# Patient Record
Sex: MALE | Race: WHITE
[De-identification: names, ages, dates, MRNs, and addresses within clinical notes are randomized per-mention and may not be internally consistent; named-entity substitution may affect disease eponyms.]

---

## 2018-07-18 NOTE — HISTORY AND PHYSICAL
DATE OF SERVICE:  



ADMISSION HISTORY AND PHYSICAL



This will be for date of service and date of admission of 07/25/2018 for left

total knee arthroplasty.



HISTORY OF PRESENT ILLNESS:

The patient is a 68-year-old gentleman with progressively worsening left knee

pain, swelling and activity limitations.  Radiographs reveal severe medial and

patellofemoral arthrosis with complete loss of joint space.  He has undergone

treatment in the past with arthroscopy, injections and anti-inflammatories

without relief.  He reports he is unable to participate in his activities of

daily living because of the knee.  Due to functional impairment and failure to

improve with conservative measures, the patient elected to proceed with surgical

intervention.



REVIEW OF SYSTEMS:

No chest pain, no shortness of breath and no dysuria.



PAST MEDICAL HISTORY:

Hypertension.



PAST SURGICAL HISTORY:

Heart catheterization and arthroscopy.



FAMILY HISTORY:

Ischemic heart disease and hypertension.



PRIMARY CARE PROVIDER:

 _____



MEDICATIONS:

Alprazolam, TriCor, meloxicam, hydrochlorothiazide, Norvasc, Zocor, Benicar,

aspirin and fish oil.



ALLERGIES:

No known drug allergies.



SOCIAL HISTORY:

The patient smokes half pack of cigarettes a day.  Drinks alcohol daily.



PHYSICAL EXAMINATION:

GENERAL:  The patient is well-developed, well-nourished, no acute distress.

HEENT:  Normocephalic and atraumatic.  Pupils are equal, round and reactive to

light.  Oropharynx is clear.

NECK:  Supple and no lymphadenopathy.

LUNGS:  Clear to auscultation bilaterally.

HEART:  Regular rate and rhythm.

ABDOMEN:  Soft, nontender and nondistended.

EXTREMITIES:  The left knee demonstrates a slight effusion.  There is no

erythema or warmth.  Range of motion is 0/3/125 with a negative Lachman,

negative anterior and posterior drawer.  No varus valgus laxity and negative

pivot shift.  The patient ambulates with an antalgic gait.



IMPRESSION:

Severe left knee osteoarthritis.



PLAN:

Left total knee arthroplasty.  The risks, benefits, options, ramifications and

recovery have been discussed with the patient.  He understands and wishes to

proceed.  He will require inpatient admission for pain management, gait

abnormalities and weakness.





Job ID: [id number]

DocumentID: [id number]

Dictated Date:  07/18/2018 12:04:06

Transcription Date: 07/18/2018 12:27:08

Dictated By: MOLINA MEDINA MD

## 2018-07-19 ENCOUNTER — HOSPITAL ENCOUNTER (OUTPATIENT)
Dept: HOSPITAL 75 - PREOP | Age: 68
Discharge: HOME | End: 2018-07-19
Attending: ORTHOPAEDIC SURGERY
Payer: COMMERCIAL

## 2018-07-19 VITALS — BODY MASS INDEX: 27.64 KG/M2 | WEIGHT: 172 LBS | HEIGHT: 66 IN

## 2018-07-19 VITALS — DIASTOLIC BLOOD PRESSURE: 83 MMHG | SYSTOLIC BLOOD PRESSURE: 155 MMHG

## 2018-07-19 DIAGNOSIS — Z11.2: ICD-10-CM

## 2018-07-19 DIAGNOSIS — R53.83: ICD-10-CM

## 2018-07-19 DIAGNOSIS — Z01.812: ICD-10-CM

## 2018-07-19 DIAGNOSIS — M17.12: ICD-10-CM

## 2018-07-19 DIAGNOSIS — Z01.811: Primary | ICD-10-CM

## 2018-07-19 LAB
ALBUMIN SERPL-MCNC: 4.6 GM/DL (ref 3.2–4.5)
ALP SERPL-CCNC: 40 U/L (ref 40–136)
ALT SERPL-CCNC: 29 U/L (ref 0–55)
APTT PPP: YELLOW S
BACTERIA #/AREA URNS HPF: (no result) /HPF
BASOPHILS # BLD AUTO: 0.1 10^3/UL (ref 0–0.1)
BASOPHILS NFR BLD AUTO: 1 % (ref 0–10)
BILIRUB SERPL-MCNC: 0.4 MG/DL (ref 0.1–1)
BILIRUB UR QL STRIP: NEGATIVE
BUN/CREAT SERPL: 27
CALCIUM SERPL-MCNC: 11.1 MG/DL (ref 8.5–10.1)
CHLORIDE SERPL-SCNC: 105 MMOL/L (ref 98–107)
CO2 SERPL-SCNC: 27 MMOL/L (ref 21–32)
CREAT SERPL-MCNC: 1.07 MG/DL (ref 0.6–1.3)
EOSINOPHIL # BLD AUTO: 0.2 10^3/UL (ref 0–0.3)
EOSINOPHIL NFR BLD AUTO: 2 % (ref 0–10)
ERYTHROCYTE [DISTWIDTH] IN BLOOD BY AUTOMATED COUNT: 14.1 % (ref 10–14.5)
ERYTHROCYTE [SEDIMENTATION RATE] IN BLOOD: 1 MM/HR (ref 0–30)
FIBRINOGEN PPP-MCNC: CLEAR MG/DL
GFR SERPLBLD BASED ON 1.73 SQ M-ARVRAT: > 60 ML/MIN
GLUCOSE SERPL-MCNC: 104 MG/DL (ref 70–105)
GLUCOSE UR STRIP-MCNC: NEGATIVE MG/DL
HCT VFR BLD CALC: 41 % (ref 40–54)
HGB BLD-MCNC: 14 G/DL (ref 13.3–17.7)
INR PPP: 1 (ref 0.8–1.4)
KETONES UR QL STRIP: NEGATIVE
LEUKOCYTE ESTERASE UR QL STRIP: NEGATIVE
LYMPHOCYTES # BLD AUTO: 3.9 X 10^3 (ref 1–4)
LYMPHOCYTES NFR BLD AUTO: 35 % (ref 12–44)
MANUAL DIFFERENTIAL PERFORMED BLD QL: NO
MCH RBC QN AUTO: 30 PG (ref 25–34)
MCHC RBC AUTO-ENTMCNC: 34 G/DL (ref 32–36)
MCV RBC AUTO: 87 FL (ref 80–99)
MONOCYTES # BLD AUTO: 1 X 10^3 (ref 0–1)
MONOCYTES NFR BLD AUTO: 9 % (ref 0–12)
NEUTROPHILS # BLD AUTO: 5.9 X 10^3 (ref 1.8–7.8)
NEUTROPHILS NFR BLD AUTO: 53 % (ref 42–75)
NITRITE UR QL STRIP: NEGATIVE
PH UR STRIP: 6.5 [PH] (ref 5–9)
PLATELET # BLD: 393 10^3/UL (ref 130–400)
PMV BLD AUTO: 10.7 FL (ref 7.4–10.4)
POTASSIUM SERPL-SCNC: 3.5 MMOL/L (ref 3.6–5)
PROT SERPL-MCNC: 7.6 GM/DL (ref 6.4–8.2)
PROT UR QL STRIP: NEGATIVE
PROTHROMBIN TIME: 13 SEC (ref 12.2–14.7)
RBC # BLD AUTO: 4.7 10^6/UL (ref 4.35–5.85)
RBC #/AREA URNS HPF: (no result) /HPF
SODIUM SERPL-SCNC: 139 MMOL/L (ref 135–145)
SP GR UR STRIP: 1.01 (ref 1.02–1.02)
SQUAMOUS #/AREA URNS HPF: (no result) /HPF
UROBILINOGEN UR-MCNC: NORMAL MG/DL
WBC # BLD AUTO: 11.1 10^3/UL (ref 4.3–11)
WBC #/AREA URNS HPF: (no result) /HPF

## 2018-07-19 PROCEDURE — 85652 RBC SED RATE AUTOMATED: CPT

## 2018-07-19 PROCEDURE — 86850 RBC ANTIBODY SCREEN: CPT

## 2018-07-19 PROCEDURE — 85610 PROTHROMBIN TIME: CPT

## 2018-07-19 PROCEDURE — 86900 BLOOD TYPING SEROLOGIC ABO: CPT

## 2018-07-19 PROCEDURE — 80053 COMPREHEN METABOLIC PANEL: CPT

## 2018-07-19 PROCEDURE — 71046 X-RAY EXAM CHEST 2 VIEWS: CPT

## 2018-07-19 PROCEDURE — 86901 BLOOD TYPING SEROLOGIC RH(D): CPT

## 2018-07-19 PROCEDURE — 87081 CULTURE SCREEN ONLY: CPT

## 2018-07-19 PROCEDURE — 81000 URINALYSIS NONAUTO W/SCOPE: CPT

## 2018-07-19 PROCEDURE — 85025 COMPLETE CBC W/AUTO DIFF WBC: CPT

## 2018-07-19 PROCEDURE — 36415 COLL VENOUS BLD VENIPUNCTURE: CPT

## 2018-07-19 NOTE — DIAGNOSTIC IMAGING REPORT
INDICATION: Preop for total knee arthroplasty.



TIME OF EXAMINATION: 02:30 p.m.



COMPARISON: Comparison is made with prior study from 02/22/2011.



FINDINGS: The heart size is normal. The pulmonary vascularity is

unremarkable. The lungs are clear. No infiltrate, effusion or

pneumothorax is detected.



IMPRESSION: No acute cardiopulmonary process is detected.



Dictated by: 



  Dictated on workstation # [id number]

## 2018-07-25 ENCOUNTER — HOSPITAL ENCOUNTER (INPATIENT)
Dept: HOSPITAL 75 - 4TH | Age: 68
LOS: 2 days | Discharge: HOME HEALTH | DRG: 470 | End: 2018-07-27
Attending: ORTHOPAEDIC SURGERY | Admitting: ORTHOPAEDIC SURGERY
Payer: COMMERCIAL

## 2018-07-25 VITALS — SYSTOLIC BLOOD PRESSURE: 126 MMHG | DIASTOLIC BLOOD PRESSURE: 76 MMHG

## 2018-07-25 VITALS — WEIGHT: 172 LBS | HEIGHT: 66 IN | BODY MASS INDEX: 27.64 KG/M2

## 2018-07-25 VITALS — SYSTOLIC BLOOD PRESSURE: 119 MMHG | DIASTOLIC BLOOD PRESSURE: 61 MMHG

## 2018-07-25 VITALS — DIASTOLIC BLOOD PRESSURE: 66 MMHG | SYSTOLIC BLOOD PRESSURE: 130 MMHG

## 2018-07-25 DIAGNOSIS — M17.12: Primary | ICD-10-CM

## 2018-07-25 DIAGNOSIS — I10: ICD-10-CM

## 2018-07-25 DIAGNOSIS — K21.9: ICD-10-CM

## 2018-07-25 DIAGNOSIS — F41.9: ICD-10-CM

## 2018-07-25 DIAGNOSIS — Z72.89: ICD-10-CM

## 2018-07-25 DIAGNOSIS — F17.210: ICD-10-CM

## 2018-07-25 PROCEDURE — 0SRD0J9 REPLACEMENT OF LEFT KNEE JOINT WITH SYNTHETIC SUBSTITUTE, CEMENTED, OPEN APPROACH: ICD-10-PCS | Performed by: ORTHOPAEDIC SURGERY

## 2018-07-25 PROCEDURE — 85018 HEMOGLOBIN: CPT

## 2018-07-25 PROCEDURE — 86850 RBC ANTIBODY SCREEN: CPT

## 2018-07-25 PROCEDURE — 86900 BLOOD TYPING SEROLOGIC ABO: CPT

## 2018-07-25 PROCEDURE — 94664 DEMO&/EVAL PT USE INHALER: CPT

## 2018-07-25 PROCEDURE — 86901 BLOOD TYPING SEROLOGIC RH(D): CPT

## 2018-07-25 PROCEDURE — 73560 X-RAY EXAM OF KNEE 1 OR 2: CPT

## 2018-07-25 PROCEDURE — 85014 HEMATOCRIT: CPT

## 2018-07-25 PROCEDURE — 36415 COLL VENOUS BLD VENIPUNCTURE: CPT

## 2018-07-25 RX ADMIN — OXYCODONE HYDROCHLORIDE AND ACETAMINOPHEN PRN TAB: 5; 325 TABLET ORAL at 18:21

## 2018-07-25 RX ADMIN — SODIUM CHLORIDE SCH MLS/HR: 900 INJECTION, SOLUTION INTRAVENOUS at 10:41

## 2018-07-25 RX ADMIN — DOCUSATE SODIUM AND SENNOSIDES SCH EA: 8.6; 5 TABLET, FILM COATED ORAL at 12:01

## 2018-07-25 RX ADMIN — OXYCODONE HYDROCHLORIDE AND ACETAMINOPHEN PRN TAB: 5; 325 TABLET ORAL at 14:50

## 2018-07-25 RX ADMIN — DOCUSATE SODIUM AND SENNOSIDES SCH EA: 8.6; 5 TABLET, FILM COATED ORAL at 20:02

## 2018-07-25 RX ADMIN — CEFUROXIME SCH MLS/HR: 750 INJECTION, POWDER, FOR SOLUTION INTRAMUSCULAR; INTRAVENOUS at 15:08

## 2018-07-25 RX ADMIN — SODIUM CHLORIDE, SODIUM LACTATE, POTASSIUM CHLORIDE, AND CALCIUM CHLORIDE PRN MLS/HR: 600; 310; 30; 20 INJECTION, SOLUTION INTRAVENOUS at 07:45

## 2018-07-25 RX ADMIN — MORPHINE SULFATE PRN MG: 10 INJECTION, SOLUTION INTRAMUSCULAR; INTRAVENOUS at 09:36

## 2018-07-25 RX ADMIN — SODIUM CHLORIDE SCH MLS/HR: 900 INJECTION, SOLUTION INTRAVENOUS at 22:47

## 2018-07-25 RX ADMIN — MORPHINE SULFATE PRN MG: 1 INJECTION, SOLUTION INTRAVENOUS at 10:49

## 2018-07-25 RX ADMIN — CEFUROXIME SCH MLS/HR: 750 INJECTION, POWDER, FOR SOLUTION INTRAMUSCULAR; INTRAVENOUS at 23:08

## 2018-07-25 RX ADMIN — MORPHINE SULFATE PRN MG: 10 INJECTION, SOLUTION INTRAMUSCULAR; INTRAVENOUS at 09:30

## 2018-07-25 RX ADMIN — SODIUM CHLORIDE, SODIUM LACTATE, POTASSIUM CHLORIDE, AND CALCIUM CHLORIDE PRN MLS/HR: 600; 310; 30; 20 INJECTION, SOLUTION INTRAVENOUS at 06:49

## 2018-07-25 NOTE — PROGRESS NOTE-POST OPERATIVE
Post-Operative Progess Note


Surgeon (s)/Assistant (s)


Surgeon


MOLINA MEDINA MD


Assistant:  Dewayne Krishnan





Pre-Operative Diagnosis


left knee primary osteoarthritis





Post-Operative Diagnosis





left knee primary osteoarthritis





Procedure & Operative Findings


Date of Procedure


7/25/18


Procedure Performed/Findings


left total knee arthroplasty


Anesthesia Type


GETA plus regional





Estimated Blood Loss


Estimated blood loss (mL):  minimal





Specimens/Packing


Specimens Removed


none


Packing:  


none











MOLINA MEDINA MD Jul 25, 2018 07:25

## 2018-07-25 NOTE — PHYSICAL THERAPY EVALUATION
PT Evaluation-General


Medical Diagnosis


Admission Date


2018 at 05:50


Medical Diagnosis:  Left knee OA


Onset Date:  2018





Therapy Diagnosis


Therapy Diagnosis:  weakness; abn gait





Height/Weight


Height (Feet):  5


Height (Inches):  6.00


Weight (Pounds):  172


Weight (Ounces):  0.0





Precautions


Precautions/Isolations:  Fall Prevention, Standard Precautions





Weight Bear Status


Right Lower Extremity:  Right


Full Weight Bearing


Left Lower Extremity:  Left


Weight Bearing/Tolerated





Referral


Physician:  Shabana


Reason for Referral:  Evaluation/Treatment


Referral Comments


TKR protocol





Medical History


Pertinent Medical History:  HTN


Additional Medical History


Ischemic heart disease


Current History


Elective left TKR.


Reviewed History:  Yes





Social History


Home:  Single Level


Current Living Status:  Spouse


Entry Into Home:  Stairs With Railing


PT Steps Into Home:  3


PT Steps Inside Home:  0





Prior/Core FIM


Prior Level of Function


              Functional Moore Measure


0=Not Assessed/NA   4=Minimal Assistance


1=Total Assistance   5=Supervision or Setup


2=Maximal Assistance   6=Modified Moore


3=Moderate Assistance   7=Complete Moore


Bed Mobility:  7


Transfers (B,C,W/C) (FIM):  7


Gait:  7


Active; drives; owns a convenient store.





PT Evaluation-Current


Subjective


;agrees to PT.





Pain





   Numeric Pain Scale:  6


   Location:  Left


   Location Body Site:  Knee


   Pain Description:  Ache





Pt/Family Goals


Home ASAP





Objective


Patient Orientation:  Person, Place, Time, Situation


Problem Solving:  Good





ROM/Strength


ROM Lower Extremities


Right knee LE WNL; LLE WNL except knee is 0-5-75 degrees.


Strength Lower Extremities


Right LE WNL; L LE grossly 3/5





Integumentary/Posture


Integumentary


Refer to nursing notes.


Bowel Incontinence:  No


Bladder Incontinence:  No


Posture


normal and symmetrical





Neuromuscular


(Tone, Coordination, Reflexes)


No noted functional deficit





Sensory


Vision:  Functional


Hearing:  Functional


Hand Dominance:  Right


Sensation Right Lower Extremit:  Intact


Sensation Left Lower Extremity:  Intact





Transfers


              Functional Moore Measure


0=Not Assessed/NA   4=Minimal Assistance


1=Total Assistance   5=Supervision or Setup


2=Maximal Assistance   6=Modified Moore


3=Moderate Assistance   7=Complete Moore


Transfers (B, C, W/C) (FIM):  4


Scootin


Supine to/from Sit:  4


Sit to/from Stand:  4


Skilled cues for sequencing and task segmentation





Gait


Mode of Locomotion:  Walk


Anticipated Mode of Locomotion:  Walk


Gait (FIM):  2


Distance (FIM):  0=925-65 ft


Distance:  125 ft


Gait Level of Assist:  4


Gait Persons Needed:  1


Gait Assistive Device:  FWW


Comments/Gait Description


Decreased step length with the right LE; decreased WB through the right.  Slow 

gait.





Balance


Sitting Static:  Normal


Sitting Dynamic:  Normal


Standing Static:  Fair


 Standing Dynamic:  Fair





Treatment


UP in chair; performed AP and LAQ in sitting.





Assessment/Needs


Post left elective TKR.  He has diminished strength and ROM and will benefit 

from skilled PT to address functional mobility as well as strength and ROM to 

return to PLOF and progress mobioltiy.


Rehab Potential:  Good





PT Long Term Goals


Long Term Goals


PT Long Term Goals Time Frame:  2018


Transfers (B,C,W/C) (FIM):  6


Gait (FIM):  6


Gait distance (FIM):  3=150 ft


Gait Assistive Device:  FWW


Stairs (FIM):  5





PT Plan


Problem List


Problem List:  Activity Tolerance, Functional Strength, Safety, Balance, Gait, 

Transfer, Bed Mobility





Treatment/Plan


Treatment Plan:  Continue Plan of Care


Treatment Plan:  Bed Mobility, Education, Functional Activity Nathalia, Functional 

Strength, Gait, Safety, Therapeutic Exercise, Transfers


Treatment Duration:  2018


Frequency:  11 times per week


Estimated Hrs Per Day:  1 hour per day


Patient and/or Family Agrees t:  Yes





Safety Risks/Education


Patient Education:  Gait Training, Transfer Techniques


Teaching Recipient:  Patient


Teaching Methods:  Demonstration, Discussion


Response to Teaching:  Reinforcement Needed





Time/GCodes


Time In:  1340


Time Out:  1410


Total Billed Treatment Time:  30


Total Billed Treatment


visit


EVM 15


GT 15











CHEIKH CHI PT 2018 15:03

## 2018-07-25 NOTE — D/C HH FACE TO FACE ORDER
D/C  Face to Face Orders


Instructions for Patient


Patient Instructions/FollowUp:  


three weeks


Physician to follow Patient:  three weeks


Discharge Diet for Home:  Regular Diet





Patient Data-Allergies,Ht & Wt


Patient Allergies:  


Coded Allergies:  


     No Known Drug Allergies (Unverified , 7/19/18)


Height (Feet):  5


Height (Inches):  6.00


Weight (Pounds):  172


Weight (Ounces):  0.0





Home Health Need/Face to Face


Date of Face to Face:  Jul 25, 2018


Clinical Findings:  Instability, Muscle weakness, Pain with ambulation, 

Unsteady gait


I have seen Pt face-to-face:  Yes


Discharged To:  Home


Diagnosis/Conditions:  


left total knee arthroplasty


Patient is Homebound due to:  Dillon fall risk due to instabilty, Pain w/

ambulation


Homebound Status


   Due to the above stated illness, injury or surgical procedure (medical 

condition or diagnosis) and associated clinical findings, the patient is 

homebound because of his/her inability to leave home except with aid of a 

supportive device and/or person AND leaving the home requires a considerable 

and taxing effort or is medically contraindicated.


Pt req the following assistanc:  Walker





Home Health Nursing Orders


Home Health Services Order:  Physical Therapy-Evaluate & Treat





Home Health Infusion Therapy


Line Start Date:  Jul 25, 2018


Line Start Time:  0615


Line Type:  Peripheral IV


Site Location:  Forearm





Therapy Orders


Therapy Orders:  PT to assess for OT


Therapy Specific Orders:  Eval assistive deivces, Teach enviro modifications/

safety, Gait training, Increase strength/endurance, Provider maintenance therapy

, Restore ROM (DC left knee staples and apply steri strips 8/8/18)


Certify Stmt


I certify that this patient is under my care and that I, a nurse practitioner 

or a physician; a assistant working with me, had a face to face encounter that -

meets the physician face to face encounter requirements with this patient as 

dated.











MOLINA MEDINA MD Jul 25, 2018 07:28

## 2018-07-25 NOTE — PROGRESS NOTE-STANDARD
Last seen 09/14/17  Has appointment 12/14/17   Standard Progress Note


Progress Notes/Assess & Plan


Date Seen by Provider:  Jul 25, 2018


Time Seen by Provider:  09:58


Progress/Assessment & Plan


post op check


No complaints


radiographs--HW well positioned without fracture


LLE--2 plus DP pulse with brisk cap refill, Intact DF and PF of toes and ankle 

with intact sensation throughout


s/p LTKA


Mobilize as able











MOLINA MEDINA MD Jul 25, 2018 09:59

## 2018-07-25 NOTE — PROGRESS NOTE-PRE OPERATIVE
Pre-Operative Progress Note


H&P Reviewed


The H&P was reviewed, patient examined and no changes noted.


Date Seen by Provider:  Jul 25, 2018


Time Seen by Provider:  07:05


Date H&P Reviewed:  Jul 25, 2018


Time H&P Reviewed:  07:05


Pre-Operative Diagnosis:  left knee primary osteoarthritis











MOLINA MEDINA MD Jul 25, 2018 07:23

## 2018-07-25 NOTE — OPERATIVE REPORT
DATE OF SERVICE:  07/25/2018



PREOPERATIVE DIAGNOSIS:

Left knee primary osteoarthritis.



POSTOPERATIVE DIAGNOSIS:

Left knee primary osteoarthritis.



PROCEDURE:

Left total knee arthroplasty.



SURGEON:

Willy Medina MD.



ASSISTANT:

Dewayne Krishnan, who assisted throughout the procedure and closed the incision.



ANESTHESIA:

General endotracheal by Dr. Dixon.



TOURNIQUET TIME:

Approximately 65 minutes at 300 mmHg.



ESTIMATED BLOOD LOSS:

Minimal.



DRAINS:

None.



COMPLICATIONS:

None.



POSTOPERATIVE PLAN:

Routine protocol.  The patient was transported to the recovery room awake and in

stable condition.



MATERIALS:

MicroPort cemented size 5 femur, cemented size 4+ tibia with 10 mm insert and a

cemented size 32 patella.



STATEMENT OF MEDICAL NECESSITY:

The patient is a 68-year-old gentleman with longstanding progressive left knee

pain, catching, locking and activity limitations.  He had undergone treatment

with injections with only temporary relief of the symptoms.  Radiographs

revealed severe medial and patellofemoral arthrosis and due to functional

impairment and failure to improve with conservative measures, the patient

elected to proceed with surgical intervention.



DESCRIPTION OF PROCEDURE:

After risks and benefits of the procedure were discussed and questions were

answered, an informed consent was signed and placed on chart.  The operative

site was confirmed in the preoperative holding area initialed by the surgeon. 

The patient was then transferred to the operating room.  After adequate levels

of general endotracheal anesthetic were obtained, a timeout was called

confirming the operative site.  The left lower extremity was then prepped and

draped in the usual sterile fashion with the leg elevated and the knee flexed,

tourniquet inflated to 300 mmHg.  A standard anterior approach was utilized. 

Hemostasis was obtained with cautery.  A medial parapatellar arthrotomy was

performed leaving 1 cm cuff on the patella for later reattachment.  A portion of

the fat pad was resected.  The ACL was resected and a subperiosteal release was

performed of the proximal medial tibia with a curved osteotome.  The

intramedullary guide was passed into the femur.  The distal cutting block was

placed and distal cut was made.  The sizer was placed and the femur sized to a

size 5.  The 5 cutting block was placed parallel to the epicondylar axis and

cuts were made from posterior to anterior.  A subperiosteal release was then

carefully performed on the posterior distal femur, being careful to stay on the

bony surface.  Intramedullary guide was passed into the tibia.  The cutting

block was placed.  The drop mamadou transected the intermalleolar axis and the cut

was made.  The 4+baseplate provided excellent coverage and the drop mamadou again

transected.  The intermalleolar axis _____ then prepared with a drill and keel

punch.  The femoral trial was placed and the trochlear cut was made.  A 10 mm

insert was placed.  There was anterior translation in flexion.  Therefore, a

partial release of the PCL was performed off of its femoral attachment and this

led to fluid range of motion with full extension and 130 degrees of flexion with

gravity.  No anterior/posterior or medial/lateral laxity in flexion or

extension.  The patella was then prepared using the freehand technique by

resecting 10 mm off the undersurface of patella.  Guide was placed and the drill

holes were made.  The patella trial was placed and the knee was taken through

range of motion.  The patella tracked well.  The trials were removed.  The joint

was irrigated with pulse lavage.  The bone ends were irrigated and dried and the

tibial baseplate was cemented into position.  Excessive cement was removed and

the superior surface was irrigated and dried and the 10 mm insert was placed. 

The distal femur was irrigated and dried and the femoral prosthesis was cemented

into position.  Excess cement was removed.  The knee was brought out into full

extension and held until the cement had cured.  The undersurface of patella was

irrigated and dried and the patellar button was cemented into position.  Excess

cement was removed.  Once the cement had cured, the knee was taken through range

of motion.  Full extension was easily obtained; 130 degrees of flexion with

gravity was easily obtained.  There was no anterior/posterior or medial/lateral

laxity in flexion or extension.  The joint was further irrigated with pulse

lavage and the arthrotomy was closed with #2 Tevdek in figure-of-eight

interrupted fashion.  The knee was flexed.  Patella tracked well with no undue

tension at the repair site.  The subcutaneous tissues were irrigated using a

total of 6 liters throughout the procedure.  A 0 Vicryl was used to close the

deep subcutaneous layer, 2-0 Vicryl for the superficial subcutaneous layer,

staples used on the skin.  A soft dressing was applied.  The tourniquet was

deflated.  The patient was transported to the recovery room awake and in stable

condition.





Job ID: [id number]

DocumentID: [id number]

Dictated Date:  07/25/2018 09:20:54

Transcription Date: 07/25/2018 14:31:48

Dictated By: WILLY MEDINA MD

## 2018-07-25 NOTE — PHYSICAL THERAPY PROGRESS NOTE
Therapy Progress Note


CPM and pads applied at 1120.  CPM 0-50 degrees.











CHEIKH CHI PT Jul 25, 2018 14:57

## 2018-07-25 NOTE — DIAGNOSTIC IMAGING REPORT
EXAMINATION: Left knee in OR at 09:33 a.m.



INDICATION: Postop.



FINDINGS: AP and lateral views of the left knee were received

from the OR. There are no prior studies available for comparison.



There is a total knee prosthesis in place. The prosthetic

components appear to be in good position. There is also a row of

skin staples extending longitudinally along the anterior aspect

of the knee joint. There is a fair amount of gas within the soft

tissues of the knee joint and there is some gas within the joint

itself. There is no fracture or acute bony abnormality

appreciated.



IMPRESSION: Stable postoperative left knee.



Dictated by: 



  Dictated on workstation # [id number]

## 2018-07-26 VITALS — SYSTOLIC BLOOD PRESSURE: 138 MMHG | DIASTOLIC BLOOD PRESSURE: 63 MMHG

## 2018-07-26 VITALS — SYSTOLIC BLOOD PRESSURE: 144 MMHG | DIASTOLIC BLOOD PRESSURE: 67 MMHG

## 2018-07-26 VITALS — DIASTOLIC BLOOD PRESSURE: 69 MMHG | SYSTOLIC BLOOD PRESSURE: 149 MMHG

## 2018-07-26 VITALS — DIASTOLIC BLOOD PRESSURE: 73 MMHG | SYSTOLIC BLOOD PRESSURE: 132 MMHG

## 2018-07-26 VITALS — SYSTOLIC BLOOD PRESSURE: 129 MMHG | DIASTOLIC BLOOD PRESSURE: 63 MMHG

## 2018-07-26 VITALS — SYSTOLIC BLOOD PRESSURE: 141 MMHG | DIASTOLIC BLOOD PRESSURE: 67 MMHG

## 2018-07-26 VITALS — DIASTOLIC BLOOD PRESSURE: 66 MMHG | SYSTOLIC BLOOD PRESSURE: 135 MMHG

## 2018-07-26 LAB
HCT VFR BLD CALC: 33 % (ref 40–54)
HGB BLD-MCNC: 11.3 G/DL (ref 13.3–17.7)

## 2018-07-26 RX ADMIN — OXYCODONE HYDROCHLORIDE AND ACETAMINOPHEN PRN TAB: 5; 325 TABLET ORAL at 17:44

## 2018-07-26 RX ADMIN — ENOXAPARIN SODIUM SCH MG: 30 INJECTION SUBCUTANEOUS at 08:43

## 2018-07-26 RX ADMIN — OXYCODONE HYDROCHLORIDE AND ACETAMINOPHEN PRN TAB: 5; 325 TABLET ORAL at 08:42

## 2018-07-26 RX ADMIN — Medication SCH EA: at 06:22

## 2018-07-26 RX ADMIN — DOCUSATE SODIUM AND SENNOSIDES SCH EA: 8.6; 5 TABLET, FILM COATED ORAL at 20:52

## 2018-07-26 RX ADMIN — MORPHINE SULFATE PRN MG: 1 INJECTION, SOLUTION INTRAVENOUS at 13:50

## 2018-07-26 RX ADMIN — OXYCODONE HYDROCHLORIDE AND ACETAMINOPHEN PRN TAB: 5; 325 TABLET ORAL at 12:55

## 2018-07-26 RX ADMIN — OXYCODONE HYDROCHLORIDE AND ACETAMINOPHEN PRN TAB: 5; 325 TABLET ORAL at 15:05

## 2018-07-26 RX ADMIN — SODIUM CHLORIDE SCH MLS/HR: 900 INJECTION, SOLUTION INTRAVENOUS at 20:59

## 2018-07-26 RX ADMIN — SODIUM CHLORIDE SCH MLS/HR: 900 INJECTION, SOLUTION INTRAVENOUS at 22:50

## 2018-07-26 RX ADMIN — ENOXAPARIN SODIUM SCH MG: 30 INJECTION SUBCUTANEOUS at 20:47

## 2018-07-26 RX ADMIN — DOCUSATE SODIUM AND SENNOSIDES SCH EA: 8.6; 5 TABLET, FILM COATED ORAL at 08:47

## 2018-07-26 RX ADMIN — OXYCODONE HYDROCHLORIDE AND ACETAMINOPHEN PRN TAB: 5; 325 TABLET ORAL at 20:47

## 2018-07-26 RX ADMIN — SODIUM CHLORIDE SCH MLS/HR: 900 INJECTION, SOLUTION INTRAVENOUS at 08:43

## 2018-07-26 NOTE — ANESTHESIA-GENERAL POST-OP
General


Patient Condition


Mental Status/LOC:  Same as Preop


Cardiovascular:  Satisfactory


Nausea/Vomiting:  Absent


Respiratory:  Satisfactory


Pain:  Controlled


Complications:  Absent





Post Op Complications


Complications


None





Follow Up Care/Instructions


Patient Instructions


None needed.





Anesthesia/Patient Condition


Patient Condition


Patient is doing well, no complaints, stable vital signs, no apparent adverse 

anesthesia problems.   


No complications reported per nursing.


D/C home per [facility] Criteria:  Yes











MAGUI YEE CRNA Jul 26, 2018 07:44

## 2018-07-26 NOTE — PROGRESS NOTE-STANDARD
Standard Progress Note


Progress Notes/Assess & Plan


Date Seen by Provider:  Jul 26, 2018


Time Seen by Provider:  07:51


Progress/Assessment & Plan


post op check


No complaints


radiographs--HW well positioned without fracture


LLE--2 plus DP pulse with brisk cap refill, Intact DF and PF of toes and ankle 

with intact sensation throughout


s/p LTKA


Mobilize as able


Final Diagnosis


No complaints





Vital Signs








  Date Time  Temp Pulse Resp B/P (MAP) Pulse Ox O2 Delivery O2 Flow Rate FiO2


 


7/26/18 04:00 98.0 69 20 141/67 (91) 98 Room Air  


 


7/26/18 00:25 97.5 76 20 129/63 (85) 96 Room Air  


 


7/25/18 21:25      Nasal Cannula 2.50 


 


7/25/18 21:24   18     


 


7/25/18 20:52 97.1 75 16 130/66 (87) 97 Room Air  


 


7/25/18 17:20     96 Room Air  


 


7/25/18 15:49 97.3 71 18 119/61 (80) 100 Nasal Cannula 2.50 


 


7/25/18 12:00 97.0 65 18 126/76 (93) 97 Nasal Cannula 2.00 


 


7/25/18 10:20      Nasal Cannula 2.00 














I & O 


 


 7/26/18





 07:00


 


Intake Total 3072 ml


 


Output Total 600 ml


 


Balance 2472 ml








Laboratory Tests








Test


 7/26/18


05:50 Range/Units


 


 


Hemoglobin 11.3 L 13.3-17.7  G/DL


 


Hematocrit 33 L 40-54  %





LLE--dressing intact. Flexion to 80. able to perform SLR. No calf tenderness. 

Neg Michelle's


s/p LTKA doing well


mobilize











MOLINA MEDINA MD Jul 26, 2018 07:52

## 2018-07-26 NOTE — OCC THERAPY PROGRESS NOTE
Therapy Progress Note


OT order received, chart reviewed.  Spoke with pt. and family.  Explained who 

OT was and what goals are.  Pt. states that he is doing well.  States that he 

was able to put his pants on earlier, and that he has been able to toilet.  Pt. 

states that he does not feel that he will have difficulty with this.  Spoke 

with pt. about AE if needed.  Family reports no concerns except possibly 

stairs.  OT demonstrated using walker to get up steps, (3 in garage), but will 

also let PT know for stair training.  Pt. verbalizes all understanding.  Thank 

you for this referral and this OT will be happy to come back if pt. feels he 

needs it.








1, visit


[id number]











RAJI JOHNS OT Jul 26, 2018 14:24

## 2018-07-27 VITALS — DIASTOLIC BLOOD PRESSURE: 64 MMHG | SYSTOLIC BLOOD PRESSURE: 131 MMHG

## 2018-07-27 VITALS — SYSTOLIC BLOOD PRESSURE: 115 MMHG | DIASTOLIC BLOOD PRESSURE: 58 MMHG

## 2018-07-27 LAB
HCT VFR BLD CALC: 32 % (ref 40–54)
HGB BLD-MCNC: 10.4 G/DL (ref 13.3–17.7)

## 2018-07-27 RX ADMIN — OXYCODONE HYDROCHLORIDE AND ACETAMINOPHEN PRN TAB: 5; 325 TABLET ORAL at 07:43

## 2018-07-27 RX ADMIN — Medication SCH EA: at 06:21

## 2018-07-27 RX ADMIN — ENOXAPARIN SODIUM SCH MG: 30 INJECTION SUBCUTANEOUS at 07:44

## 2018-07-27 RX ADMIN — DOCUSATE SODIUM AND SENNOSIDES SCH EA: 8.6; 5 TABLET, FILM COATED ORAL at 08:35

## 2018-07-27 RX ADMIN — OXYCODONE HYDROCHLORIDE AND ACETAMINOPHEN PRN TAB: 5; 325 TABLET ORAL at 02:59

## 2018-07-27 NOTE — PROGRESS NOTE-STANDARD
Standard Progress Note


Progress Notes/Assess & Plan


Date Seen by Provider:  Jul 27, 2018


Time Seen by Provider:  07:10


Progress/Assessment & Plan


post op check


No complaints


radiographs--HW well positioned without fracture


LLE--2 plus DP pulse with brisk cap refill, Intact DF and PF of toes and ankle 

with intact sensation throughout


s/p LTKA


Mobilize as able


Final Diagnosis


would like to go ;home





Vital Signs








  Date Time  Temp Pulse Resp B/P (MAP) Pulse Ox O2 Delivery O2 Flow Rate FiO2


 


7/27/18 03:09 98.9 78 18 131/64 (86) 93 Room Air  


 


7/26/18 23:27 99.1 77 18 135/66 (89) 94 Room Air  


 


7/26/18 20:47      Room Air  


 


7/26/18 19:50 99.0 77 16 144/67 (92) 95 Room Air  


 


7/26/18 16:00      Room Air  


 


7/26/18 15:54 98.7 71 16 149/69 (95) 98 Room Air  


 


7/26/18 15:40      Room Air  


 


7/26/18 13:50   8     


 


7/26/18 12:00 98.1 74 18 132/73 (92) 98 Room Air  


 


7/26/18 09:00     97 Room Air 2.50 


 


7/26/18 08:00 97.3 71 18 138/63 (88) 97 Room Air  


 


7/26/18 08:00 97.3 71 18 138/63 (88) 97 Room Air  














I & O 


 


 7/27/18





 07:00


 


Intake Total 3250 ml


 


Balance 3250 ml








Laboratory Tests








Test


 7/27/18


05:15 Range/Units


 


 


Hemoglobin 10.4 L 13.3-17.7  G/DL


 


Hematocrit 32 L 40-54  %





L knee incision clean and dry. No calf tenderness. Neg anna's


s/p LTKA doing well


DC home











MOLINA MEDINA MD Jul 27, 2018 07:11

## 2018-07-28 NOTE — DISCHARGE SUMMARY
DATE OF SERVICE:  



DIAGNOSES:

1.  Left knee primary osteoarthritis.

2.  Hypertension.

3.  Anxiety.



PROCEDURE:

Left total knee arthroplasty.



SUMMARY:

The patient is a 68-year-old gentleman who underwent a left total knee

arthroplasty on the day of admission.  Postoperatively, he did very well.  At

the time of discharge, his wound was clean and dry.  No calf tenderness. 

Negative Homans sign.  He had active flexion to 90 degrees.  He was tolerating

his diet well and tolerating pain with oral pain medication.



CONDITION AT DISCHARGE:

Good.



DISCHARGE DIET:

Regular.



FOLLOWUP:

Follow up is in three weeks.  Home physical therapy has been arranged.





Job ID: [id number]

DocumentID: [id number]

Dictated Date:  07/27/2018 07:12:33

Transcription Date: 07/28/2018 00:01:45

Dictated By: MOLINA MEDINA MD

## 2019-01-11 NOTE — HISTORY AND PHYSICAL
DATE OF SERVICE:  



ADMISSION HISTORY AND PHYSICAL



DATE OF ADMISSION:

01/23/2019.



This will be for inpatient admission on 01/23/2019 for right total knee

arthroplasty.



HISTORY OF PRESENT ILLNESS:

The patient is a 68-year-old gentleman with known right knee osteoarthritis.  He

has previously undergone injections with only temporary relief of his symptoms. 

He has difficulty with activities of daily living because of the knee. 

Radiographs reveal severe medial and patellofemoral arthrosis.  Due to

functional impairment and failure to improve with conservative measures, the

patient would like to proceed with surgical intervention.



REVIEW OF SYSTEMS:

No chest pain and no shortness of breath.  No dysuria.



PAST MEDICAL HISTORY:

Hypertension.



PAST SURGICAL HISTORY:

Left total knee arthroplasty and heart catheterization.



FAMILY HISTORY:

Significant for hypertension and ischemic heart disease.



PRIMARY CARE PROVIDER:

Dr. Haile.



MEDICATIONS:

Alprazolam, TriCor, Meloxicam, hydrochlorothiazide, Norvasc, Zocor, Benicar,

aspirin, fish oil and oxycodone.



ALLERGIES:

No known allergies.



SOCIAL HISTORY:

The patient smokes half a pack per day, drinks alcohol daily.



PHYSICAL EXAMINATION:

GENERAL:  The patient is well developed and well nourished, in no acute

distress.

HEENT:  Normocephalic and atraumatic.  Pupils are equal, round and reactive to

light.  Oropharynx is clear.

NECK:  Supple with no lymphadenopathy.

LUNGS:  Clear to auscultation bilaterally.

HEART:  Regular rate and rhythm.

ABDOMEN:  Soft, nontender and nondistended.

EXTREMITIES:  His right lower extremity demonstrates varus alignment.  He

ambulates with an antalgic gait.  Range of motion is 0/3/120 in the right knee. 

There is a slight effusion.  No skin lesions are noted.  There is no varus

valgus laxity.  Negative anterior and posterior drawer.



IMPRESSION:

Right knee severe osteoarthritis unresponsive to conservative measures.



PLAN:

Right total knee arthroplasty.  The risks, benefits, options, ramifications and

recovery were discussed at length with the patient.  He understands and wishes

to proceed.  He is ready for inpatient admission on 01/23/2019.  The patient

will require regular inpatient admission due to pain management, gait

abnormalities and weakness.





Job ID: [id number]

DocumentID: [id number]

Dictated Date:  01/11/2019 11:24:45

Transcription Date: 01/11/2019 11:36:18

Dictated By: MOLINA MEDINA MD

## 2019-01-17 ENCOUNTER — HOSPITAL ENCOUNTER (OUTPATIENT)
Dept: HOSPITAL 75 - PREOP | Age: 69
Discharge: HOME | End: 2019-01-17
Attending: ORTHOPAEDIC SURGERY
Payer: COMMERCIAL

## 2019-01-17 VITALS — SYSTOLIC BLOOD PRESSURE: 156 MMHG | DIASTOLIC BLOOD PRESSURE: 79 MMHG

## 2019-01-17 VITALS — WEIGHT: 181 LBS | HEIGHT: 66 IN | BODY MASS INDEX: 29.09 KG/M2

## 2019-01-17 DIAGNOSIS — Z11.2: ICD-10-CM

## 2019-01-17 DIAGNOSIS — R53.83: ICD-10-CM

## 2019-01-17 DIAGNOSIS — M17.11: ICD-10-CM

## 2019-01-17 DIAGNOSIS — Z01.812: Primary | ICD-10-CM

## 2019-01-17 LAB
ALBUMIN SERPL-MCNC: 4.6 GM/DL (ref 3.2–4.5)
ALP SERPL-CCNC: 44 U/L (ref 40–136)
ALT SERPL-CCNC: 31 U/L (ref 0–55)
APTT PPP: YELLOW S
BACTERIA #/AREA URNS HPF: NEGATIVE /HPF
BASOPHILS # BLD AUTO: 0.1 10^3/UL (ref 0–0.1)
BASOPHILS NFR BLD AUTO: 1 % (ref 0–10)
BILIRUB SERPL-MCNC: 0.4 MG/DL (ref 0.1–1)
BILIRUB UR QL STRIP: NEGATIVE
BUN/CREAT SERPL: 22
CALCIUM SERPL-MCNC: 10.3 MG/DL (ref 8.5–10.1)
CHLORIDE SERPL-SCNC: 106 MMOL/L (ref 98–107)
CO2 SERPL-SCNC: 26 MMOL/L (ref 21–32)
CREAT SERPL-MCNC: 1.1 MG/DL (ref 0.6–1.3)
EOSINOPHIL # BLD AUTO: 0.4 10^3/UL (ref 0–0.3)
EOSINOPHIL NFR BLD AUTO: 4 % (ref 0–10)
ERYTHROCYTE [DISTWIDTH] IN BLOOD BY AUTOMATED COUNT: 15 % (ref 10–14.5)
ERYTHROCYTE [SEDIMENTATION RATE] IN BLOOD: 1 MM/HR (ref 0–30)
FIBRINOGEN PPP-MCNC: CLEAR MG/DL
GFR SERPLBLD BASED ON 1.73 SQ M-ARVRAT: > 60 ML/MIN
GLUCOSE SERPL-MCNC: 99 MG/DL (ref 70–105)
GLUCOSE UR STRIP-MCNC: NEGATIVE MG/DL
HCT VFR BLD CALC: 44 % (ref 40–54)
HGB BLD-MCNC: 14.4 G/DL (ref 13.3–17.7)
INR PPP: 0.9 (ref 0.8–1.4)
KETONES UR QL STRIP: NEGATIVE
LEUKOCYTE ESTERASE UR QL STRIP: NEGATIVE
LYMPHOCYTES # BLD AUTO: 3.3 X 10^3 (ref 1–4)
LYMPHOCYTES NFR BLD AUTO: 34 % (ref 12–44)
MANUAL DIFFERENTIAL PERFORMED BLD QL: NO
MCH RBC QN AUTO: 29 PG (ref 25–34)
MCHC RBC AUTO-ENTMCNC: 33 G/DL (ref 32–36)
MCV RBC AUTO: 88 FL (ref 80–99)
MONOCYTES # BLD AUTO: 0.8 X 10^3 (ref 0–1)
MONOCYTES NFR BLD AUTO: 9 % (ref 0–12)
NEUTROPHILS # BLD AUTO: 5.1 X 10^3 (ref 1.8–7.8)
NEUTROPHILS NFR BLD AUTO: 53 % (ref 42–75)
NITRITE UR QL STRIP: NEGATIVE
PH UR STRIP: 7 [PH] (ref 5–9)
PLATELET # BLD: 343 10^3/UL (ref 130–400)
PMV BLD AUTO: 11 FL (ref 7.4–10.4)
POTASSIUM SERPL-SCNC: 3.5 MMOL/L (ref 3.6–5)
PROT SERPL-MCNC: 7.6 GM/DL (ref 6.4–8.2)
PROT UR QL STRIP: NEGATIVE
PROTHROMBIN TIME: 12.4 SEC (ref 12.2–14.7)
RBC # BLD AUTO: 5.05 10^6/UL (ref 4.35–5.85)
RBC #/AREA URNS HPF: (no result) /HPF
SODIUM SERPL-SCNC: 142 MMOL/L (ref 135–145)
SP GR UR STRIP: 1.01 (ref 1.02–1.02)
SQUAMOUS #/AREA URNS HPF: (no result) /HPF
UROBILINOGEN UR-MCNC: NORMAL MG/DL
WBC # BLD AUTO: 9.6 10^3/UL (ref 4.3–11)
WBC #/AREA URNS HPF: (no result) /HPF

## 2019-01-17 PROCEDURE — 86900 BLOOD TYPING SEROLOGIC ABO: CPT

## 2019-01-17 PROCEDURE — 86850 RBC ANTIBODY SCREEN: CPT

## 2019-01-17 PROCEDURE — 87081 CULTURE SCREEN ONLY: CPT

## 2019-01-17 PROCEDURE — 85652 RBC SED RATE AUTOMATED: CPT

## 2019-01-17 PROCEDURE — 85025 COMPLETE CBC W/AUTO DIFF WBC: CPT

## 2019-01-17 PROCEDURE — 36415 COLL VENOUS BLD VENIPUNCTURE: CPT

## 2019-01-17 PROCEDURE — 85610 PROTHROMBIN TIME: CPT

## 2019-01-17 PROCEDURE — 80053 COMPREHEN METABOLIC PANEL: CPT

## 2019-01-17 PROCEDURE — 86901 BLOOD TYPING SEROLOGIC RH(D): CPT

## 2019-01-17 PROCEDURE — 81000 URINALYSIS NONAUTO W/SCOPE: CPT

## 2019-01-23 ENCOUNTER — HOSPITAL ENCOUNTER (INPATIENT)
Dept: HOSPITAL 75 - SURG | Age: 69
LOS: 2 days | Discharge: HOME HEALTH | DRG: 470 | End: 2019-01-25
Attending: ORTHOPAEDIC SURGERY | Admitting: ORTHOPAEDIC SURGERY
Payer: COMMERCIAL

## 2019-01-23 VITALS — DIASTOLIC BLOOD PRESSURE: 75 MMHG | SYSTOLIC BLOOD PRESSURE: 139 MMHG

## 2019-01-23 VITALS — SYSTOLIC BLOOD PRESSURE: 154 MMHG | DIASTOLIC BLOOD PRESSURE: 72 MMHG

## 2019-01-23 VITALS — SYSTOLIC BLOOD PRESSURE: 150 MMHG | DIASTOLIC BLOOD PRESSURE: 79 MMHG

## 2019-01-23 VITALS — BODY MASS INDEX: 29.09 KG/M2 | WEIGHT: 181 LBS | HEIGHT: 66 IN

## 2019-01-23 VITALS — DIASTOLIC BLOOD PRESSURE: 76 MMHG | SYSTOLIC BLOOD PRESSURE: 144 MMHG

## 2019-01-23 DIAGNOSIS — M17.11: Primary | ICD-10-CM

## 2019-01-23 DIAGNOSIS — I25.10: ICD-10-CM

## 2019-01-23 DIAGNOSIS — F17.210: ICD-10-CM

## 2019-01-23 DIAGNOSIS — F41.9: ICD-10-CM

## 2019-01-23 DIAGNOSIS — I10: ICD-10-CM

## 2019-01-23 DIAGNOSIS — K21.9: ICD-10-CM

## 2019-01-23 DIAGNOSIS — Z96.652: ICD-10-CM

## 2019-01-23 PROCEDURE — 0SRC0J9 REPLACEMENT OF RIGHT KNEE JOINT WITH SYNTHETIC SUBSTITUTE, CEMENTED, OPEN APPROACH: ICD-10-PCS | Performed by: ORTHOPAEDIC SURGERY

## 2019-01-23 PROCEDURE — 86900 BLOOD TYPING SEROLOGIC ABO: CPT

## 2019-01-23 PROCEDURE — 94664 DEMO&/EVAL PT USE INHALER: CPT

## 2019-01-23 PROCEDURE — 73560 X-RAY EXAM OF KNEE 1 OR 2: CPT

## 2019-01-23 PROCEDURE — 85014 HEMATOCRIT: CPT

## 2019-01-23 PROCEDURE — 85018 HEMOGLOBIN: CPT

## 2019-01-23 PROCEDURE — 86901 BLOOD TYPING SEROLOGIC RH(D): CPT

## 2019-01-23 PROCEDURE — 36415 COLL VENOUS BLD VENIPUNCTURE: CPT

## 2019-01-23 PROCEDURE — 86850 RBC ANTIBODY SCREEN: CPT

## 2019-01-23 RX ADMIN — SODIUM CHLORIDE, SODIUM LACTATE, POTASSIUM CHLORIDE, AND CALCIUM CHLORIDE PRN MLS/HR: 600; 310; 30; 20 INJECTION, SOLUTION INTRAVENOUS at 07:49

## 2019-01-23 RX ADMIN — SODIUM CHLORIDE SCH MLS/HR: 900 INJECTION, SOLUTION INTRAVENOUS at 12:51

## 2019-01-23 RX ADMIN — CEFUROXIME SCH MLS/HR: 750 INJECTION, POWDER, FOR SOLUTION INTRAMUSCULAR; INTRAVENOUS at 15:50

## 2019-01-23 RX ADMIN — CEFUROXIME SCH MLS/HR: 750 INJECTION, POWDER, FOR SOLUTION INTRAMUSCULAR; INTRAVENOUS at 23:45

## 2019-01-23 RX ADMIN — DOCUSATE SODIUM AND SENNOSIDES SCH EA: 8.6; 5 TABLET, FILM COATED ORAL at 22:00

## 2019-01-23 RX ADMIN — DOCUSATE SODIUM AND SENNOSIDES SCH EA: 8.6; 5 TABLET, FILM COATED ORAL at 09:00

## 2019-01-23 RX ADMIN — SODIUM CHLORIDE, SODIUM LACTATE, POTASSIUM CHLORIDE, AND CALCIUM CHLORIDE PRN MLS/HR: 600; 310; 30; 20 INJECTION, SOLUTION INTRAVENOUS at 06:31

## 2019-01-23 NOTE — PROGRESS NOTE-PRE OPERATIVE
Pre-Operative Progress Note


H&P Reviewed


The H&P was reviewed, patient examined and no changes noted.


Date Seen by Provider:  Jan 23, 2019


Time Seen by Provider:  07:24


Date H&P Reviewed:  Jan 23, 2019


Time H&P Reviewed:  07:24


Pre-Operative Diagnosis:  right knee primary osteoarthritis











MOLINA MEDINA MD Jan 23, 2019 07:24

## 2019-01-23 NOTE — NUR
1930-PT REPORTED ACID REFLUX

1946-PRN PEPCID 20 MG GIVEN

2030-PT DENIES ACID REFLUX STILL BOTHERING HIM.

## 2019-01-23 NOTE — PROGRESS NOTE-STANDARD
Standard Progress Note


Progress Notes/Assess & Plan


Date Seen by a Provider:  Jan 23, 2019


Time Seen by a Provider:  10:10


Progress/Assessment & Plan


post op check


no complaints


radiographs hardware well aligned. no fractures


RLE--intact DF and PF of toes and ankle. 2 plus DP pulse with brisk cap refill. 

sensation intact throughout


s/p RTKA 


mobilize as able











MOLINA MEDINA MD Jan 23, 2019 10:24

## 2019-01-23 NOTE — PHYSICAL THERAPY EVALUATION
PT Evaluation-General


Medical Diagnosis


Admission Date


2019 at 06:12


Medical Diagnosis:  R TKA


Onset Date:  2019





Therapy Diagnosis


Therapy Diagnosis:  weakness, Decreased AROM, decreased mobility, gait deviation





Height/Weight


Height (Feet):  5


Height (Inches):  6.00


Weight (Pounds):  181


Weight (Ounces):  0.0





Precautions


Precautions/Isolations:  Fall Prevention, Standard Precautions





Weight Bear Status


Right Lower Extremity:  Right


Weight Bearing/Tolerated


Left Lower Extremity:  Left


Full Weight Bearing





Referral


Physician:  Dewayne Krishnan


Reason for Referral:  Evaluation/Treatment





Medical History


Pertinent Medical History:  HTN, Smoking





Social History


Home:  Single Level


Current Living Status:  Spouse


Entry Into Home:  Stairs Without Railing


PT Steps Into Home:  3





Prior/Core FIM


Prior Level of Function


Therapy Code Descriptions/Definitions 





Functional [place] Measure:


0=Not Assessed/NA        4=Minimal Assistance


1=Total Assistance        5=Supervision or Setup


2=Maximal Assistance  6=Modified [place]


3=Moderate Assistance 7=Complete [place]








Therapy Quality Codes:


6    Independent with activity with or without an assistive device


5    Patient requires set up or clean up by helper.  Patient completes activity

  by  themselves


4    Supervision or touching assist (CGA). [place] provide cues , steadying 

assist


3    The helper provides less than half the effort to complete the activity


2    The helper provides more than half the effort to complete the activity


1    Dependent.  The helper does all the effort to complete an activity 


7    Patient refused to complete or attempt activity


9    The patient did not perform the activity before the current illness or 

injury


88  Not attempted due to Medical conditions or safety concerns





Functional Abilities and Goals:


Independent: Patient completed the activities by him/herself, with or without 

an assistive device, with no assistance from a helper.


Needed Some Help: Patient needed partial assistance from another person to 

complete activities.


Dependent: A helper completed the activities for the patient. 


Unknown:


Not Applicable:


Bed Mobility:  7


Transfers (B,C,W/C) (FIM):  7


Gait:  7


Stairs:  7


Indoor Mobility (Ambulation):  Independent


Stairs:  Independent





PT Evaluation-Current


Subjective


Pt was in bed with wife in room and nurse assistant trying to get SCD's to work 

properly. Pt agreed to PT. Reports that he had gotten up to the bathroom 

already.





Pain





   Numeric Pain Scale:  5-Moderate Pain


   Location:  Right


   Location Body Site:  Knee





Objective


Patient Orientation:  Person, Place, Situation, Normal For Age


Attachments:  SCD's, Polar Pack, IV





ROM/Strength


ROM Lower Extremities


LLE WNL


RLE knee flex 90 and ext +3


Strength Lower Extremities


WNL did not test formally on RLE due to recent surgery





Integumentary/Posture


Bowel Incontinence:  No


Bladder Incontinence:  No





Neuromuscular


(Tone, Coordination, Reflexes)


NT





Sensory


Vision:  Functional


Hearing:  Functional


Sensation Right Lower Extremit:  Intact


Sensation Left Lower Extremity:  Intact





Transfers


Therapy Code Descriptions/Definitions 





Functional [place] Measure:


0=Not Assessed/NA        4=Minimal Assistance


1=Total Assistance        5=Supervision or Setup


2=Maximal Assistance  6=Modified [place]


3=Moderate Assistance 7=Complete [place]


Transfers (B, C, W/C) (FIM):  5


Scootin


Rollin


Supine to/from Sit:  5


Sit to/from Stand:  5





Gait


Mode of Locomotion:  Walk


Anticipated Mode of Locomotion:  Walk


Gait (FIM):  4


Distance (FIM):  3=150 ft


Distance:  300'


Gait Level of Assist:  4


Gait Persons Needed:  1


Gait Assistive Device:  FWW


Comments/Gait Description


Pt amb with a decreased heel strike and reports that if he walks at a normal 

pace he is only putting 50% of his weight through RLE. If he slows down he is 

able to put full weight through RLE.





Balance


Sitting Static:  Normal


Sitting Dynamic:  Normal


Standing Static:  Good


 Standing Dynamic:  Good





Assessment/Needs


Pt was able to perform supine LE ex of (AP, HS, quad sets, SAQ, and SLR) of 10 

reps each. Pt was able to get from supine to EOB with no assistance besides 

removal of polar pack and SCD's. Pt is able to transfer sit<>stand with CGA to 

FWW. Pt amb 300' with FWW and CGA. Pt returned to room to bed and is now on 

CPM. CPM is set at 80 flex and -2 ext. Pt has nurse call, phone, tray, SCD's on.


Rehab Potential:  Fair





PT Short Term Goals


Short Term Goals


Time Frame:  2019


Transfers (B,C,W/C) (FIM):  6


Gait (FIM):  6


Distance (FIM):  3=150 ft


Gait Distance Comment:  400'


Gait Level of Assist:  6


Gait Assistive Device:  FWW





PT Plan


Problem List


Problem List:  Activity Tolerance, Functional Strength, Safety, Balance, Gait, 

Transfer, Bed Mobility, ROM





Treatment/Plan


Treatment Plan:  Continue Plan of Care


Treatment Plan:  Bed Mobility, Education, Functional Activity Nathalia, Functional 

Strength, Gait, Safety, Therapeutic Exercise, Transfers


Treatment Duration:  2019


Frequency:  11 times per week


Estimated Hrs Per Day:  .25 hour per day


Patient and/or Family Agrees t:  Yes





Safety Risks/Education


Patient Education:  Gait Training, Transfer Techniques, Reviewed Precautions, 

Reviewed Use of Ice, Correct Positioning, Safety Issues


Teaching Recipient:  Patient


Teaching Methods:  Demonstration, Discussion


Response to Teaching:  Reinforcement Needed





Discharge Recommendations


Plan


Patient will perform bed mobility and transfer training, balance and endurance 

training, functional strengthening, stair training, gait training, and education

, to improve functional mobility and independence at home.


Therapy D/C Recommendations:  Home w/ Family Support, Physical Therapy Home Care





Time/GCodes


Time In:  1320


Time Out:  1355


Total Billed Treatment Time:  35


Total Billed Treatment


1 visit





EVL 20 min


GT 15 min











NATALIO ABDALLA PT 2019 14:05

## 2019-01-23 NOTE — DIAGNOSTIC IMAGING REPORT
INDICATION: Postop right knee replacement.



TIME OF EXAMINATION: 09:24 a.m.



FINDINGS: Two views of the right knee demonstrate postop changes

of total knee arthroplasty. Prosthetic elements are in good

position. No fracture or loosening is seen. Overlying skin

staples are noted.



IMPRESSION: Satisfactory postop right knee.



Dictated by: 



  Dictated on workstation # [id number]

## 2019-01-23 NOTE — OPERATIVE REPORT
DATE OF SERVICE:  01/23/2019



PREOPERATIVE DIAGNOSIS:

Right knee primary osteoarthritis.



POSTOPERATIVE DIAGNOSIS:

Right knee primary osteoarthritis.



PROCEDURE:

Right total knee arthroplasty.



SURGEON:

Willy Medina MD.



ASSISTANT:

Dewayne Krishnan, who assisted throughout the procedure and closed the incisions.



ANESTHESIA:

General endotracheal plus adductor canal block by Dewayne Quintero CRNA.



TOURNIQUET TIME:

63 minutes at 300 mmHg.



ESTIMATED BLOOD LOSS:

Minimal.



DRAINS:

None.



COMPLICATIONS:

None.



POSTOPERATIVE PLANS:

Routine total knee protocol.



MATERIALS:

MicroPort cemented size 4 femur, cemented size 4 tibia with a 10 mm insert and a

cemented size 32 patella.



STATEMENT OF MEDICAL NECESSITY:

The patient is a 69-year-old gentleman with longstanding progressive right knee

pain.  Radiographs reveal severe tricompartmental osteoarthritis.  He had

undergone treatment with injections, anti-inflammatories and rest without

relief.  Due to functional impairment and failure to improve with conservative

measures, the patient elected to proceed with surgical intervention.



DESCRIPTION OF PROCEDURE:

After risks and benefits of the procedure were discussed and questions were

answered and informed consent was signed and placed on chart, the operative site

was confirmed in the preoperative holding area initialed by the surgeon.  The

patient was transferred to the operating room and after adequate levels of

general endotracheal anesthetic were obtained, a timeout was called confirming

the operative site.  The right lower extremity was prepped and draped in the

usual sterile fashion with the leg elevated and the knee flexed.  Tourniquet was

inflated to 300 mmHg.  Standard anterior approach was utilized.  Hemostasis was

obtained with cautery.  Medial parapatellar arthrotomy was performed leaving 1

cm cuff on the patella for later reapproximation.  A portion of the fat pad was

resected.  A subperiosteal release was performed on the proximal medial tibia

being careful to stay on the bony surface.  The ACL was resected.  The

intramedullary guide was passed into the femoral canal and the distal cutting

block was placed.  Distal cut was made and the femur sized to a size 4.  The 4

cutting block was placed parallel to the epicondylar axis and the cuts were made

from posterior to anterior.  Subperiosteal release was then carefully performed

on the posterior distal femur, being careful to stay on the bony surface. 

Intramedullary guide was then passed into the tibia.  The cutting block was

placed.  The drop mamadou transected the _____ axis and the cut was made.  The size

4 baseplate was placed and the drop mamadou transected the _____ axis.  This was

then prepared with the drill and keel punch.  The trials were inserted.  The

patella was then prepared by resecting 10 mm off the undersurface.  The peg

guide was placed and the peg holes were drilled.  The trials were inserted with

a 10 mm insert.  Full extension was easily obtained, 120 degrees of flexion with

gravity was easily obtained.  The patella tracked well.  There was no

anterior/posterior or medial/lateral laxity in flexion or extension.  The trials

were removed.  The joint was irrigated with pulse lavage.  The periarticular

block was placed in the posterior capsule, medial and lateral retinaculum and

extensor mechanism as well as subcutaneous tissues.  The joint was further

irrigated.  The bone ends were irrigated and dried.  The tibial baseplate was

cemented into position.  Excess cement was removed.  The superior surface was

irrigated and dried.  The polyethylene insert was placed.  The distal femur was

irrigated and dried and the femoral prosthesis was cemented into position. 

Excessive cement was removed.  The knee was brought out into full extension

until the cement had cured.  The undersurface of the patella was irrigated and

dried.  The patellar button was cemented into position.  Excessive cement was

removed.  Once the cement had cured, the knee was taken through range of motion.

 Full extension was easily obtained, 120 degrees of flexion with gravity was

easily obtained.  The patella tracked well.  There was no anterior/posterior or

medial/lateral laxity in flexion or extension.  The joint was further irrigated

with pulse lavage.  The arthrotomy was closed with #2 Tev[place] in figure-of-eight

interrupted fashion.  The knee was flexed and the repair was stable. 

Subcutaneous tissues were irrigated using a total of 6 liters throughout the

procedure.  A 0 Vicryl was used for the deep subcutaneous tissue, 2-0 Vicryl for

the superficial subcutaneous tissue, staples used on the skin.  A sterile

dressing was applied.  The tourniquet was deflated and the patient was

transported to recovery room in awake and stable condition.





Job ID: [id number]

DocumentID: [id number]

Dictated Date:  01/23/2019 09:22:35

Transcription Date: 01/23/2019 12:22:28

Dictated By: WILLY MEDINA MD

## 2019-01-23 NOTE — NUR
PATIENT BROUGHT TO ROOM VIA HOSPITAL BED, ACCOMPANIED BY WIFE AND RECOVERY RN. REPORT 
RECEIVED. PT. ORIENTED TO ROOM AND CALL LIGHT. PT. COMPLAINED OF PAIN. PCA MORPHINE STARTED.

## 2019-01-23 NOTE — D/C HH FACE TO FACE ORDER
D/C  Face to Face Orders


Instructions for Patient


Via Zoila Summay, [phone]


Patient Instructions/FollowUp:  


three weeks


Physician to follow Patient:  three weeks


Discharge Diet for Home:  No Restrictions





Patient Data-Allergies,Ht & Wt


Patient Allergies:  


Coded Allergies:  


     No Known Drug Allergies (Unverified , 7/19/18)


Height (Feet):  5


Height (Inches):  6.00


Weight (Pounds):  181


Weight (Ounces):  0.0





Home Health Need/Face to Face


Date of Face to Face:  Jan 23, 2019


Clinical Findings:  Instability, Muscle weakness, Pain with ambulation, 

Unsteady gait


I have seen Pt face-to-face:  Yes


Discharged To:  Home


Diagnosis/Conditions:  


right total knee arthroplasty


Patient is Homebound due to:  [place] fall risk due to instabilty, Muscle weakness

, Pain w/ambulation


Homebound Status


   Due to the above stated illness, injury or surgical procedure (medical 

condition or diagnosis) and associated clinical findings, the patient is 

homebound because of his/her inability to leave home except with aid of a 

supportive device and/or person AND leaving the home requires a considerable 

and taxing effort or is medically contraindicated.


Pt req the following assistanc:  [place]





Home Health Nursing Orders


Home Health Services Order:  Physical Therapy-Evaluate & Treat





Home Health Infusion Therapy


Line Start Date:  Jan 23, 2019


Line Start Time:  0615


Line Type:  Peripheral IV


Site Location:  Wrist





Therapy Orders


Therapy Orders:  Physical Therapy, PT to assess for OT


Therapy Specific Orders:  Eval assistive deivces, Teach enviro modifications/

safety, Gait training, Increase strength/endurance, Provider maintenance therapy

, Restore ROM





DC right knee staples and apply steri strips 2/6/19


Certify Stmt


I certify that this patient is under my care and that I, a nurse practitioner 

or a physician; a assistant working with me, had a face to face encounter that -

meets the physician face to face encounter requirements with this patient as 

dated.











MOLINA MEDINA MD Jan 23, 2019 07:27

## 2019-01-23 NOTE — PROGRESS NOTE-POST OPERATIVE
Post-Operative Progess Note


Surgeon (s)/Assistant (s)


Surgeon


MOLINA MEDINA MD


Assistant:  nicolle Krishnan





Pre-Operative Diagnosis


right knee primary osteoarthritis





Post-Operative Diagnosis





right knee primary osteoarthritis





Procedure & Operative Findings


Date of Procedure


1/23/19


Procedure Performed/Findings


right total knee arthroplasty


Anesthesia Type


GETA plus block





Estimated Blood Loss


Estimated blood loss (mL):  minimal





Specimens/Packing


Specimens Removed


none


Packing:  


none











MOLINA MEDINA MD Jan 23, 2019 07:25

## 2019-01-24 VITALS — SYSTOLIC BLOOD PRESSURE: 130 MMHG | DIASTOLIC BLOOD PRESSURE: 64 MMHG

## 2019-01-24 VITALS — DIASTOLIC BLOOD PRESSURE: 72 MMHG | SYSTOLIC BLOOD PRESSURE: 162 MMHG

## 2019-01-24 VITALS — SYSTOLIC BLOOD PRESSURE: 139 MMHG | DIASTOLIC BLOOD PRESSURE: 67 MMHG

## 2019-01-24 VITALS — DIASTOLIC BLOOD PRESSURE: 68 MMHG | SYSTOLIC BLOOD PRESSURE: 136 MMHG

## 2019-01-24 VITALS — SYSTOLIC BLOOD PRESSURE: 137 MMHG | DIASTOLIC BLOOD PRESSURE: 79 MMHG

## 2019-01-24 VITALS — SYSTOLIC BLOOD PRESSURE: 148 MMHG | DIASTOLIC BLOOD PRESSURE: 76 MMHG

## 2019-01-24 VITALS — SYSTOLIC BLOOD PRESSURE: 138 MMHG | DIASTOLIC BLOOD PRESSURE: 62 MMHG

## 2019-01-24 LAB
HCT VFR BLD CALC: 37 % (ref 40–54)
HGB BLD-MCNC: 11.9 G/DL (ref 13.3–17.7)

## 2019-01-24 RX ADMIN — ALPRAZOLAM SCH MG: 0.25 TABLET ORAL at 08:56

## 2019-01-24 RX ADMIN — SODIUM CHLORIDE SCH MLS/HR: 900 INJECTION, SOLUTION INTRAVENOUS at 02:50

## 2019-01-24 RX ADMIN — SODIUM CHLORIDE SCH MLS/HR: 900 INJECTION, SOLUTION INTRAVENOUS at 08:51

## 2019-01-24 RX ADMIN — OXYCODONE HYDROCHLORIDE AND ACETAMINOPHEN PRN TAB: 5; 325 TABLET ORAL at 23:40

## 2019-01-24 RX ADMIN — ENOXAPARIN SODIUM SCH MG: 30 INJECTION SUBCUTANEOUS at 18:25

## 2019-01-24 RX ADMIN — OXYCODONE HYDROCHLORIDE AND ACETAMINOPHEN PRN TAB: 5; 325 TABLET ORAL at 16:45

## 2019-01-24 RX ADMIN — DOCUSATE SODIUM AND SENNOSIDES SCH EA: 8.6; 5 TABLET, FILM COATED ORAL at 08:46

## 2019-01-24 RX ADMIN — Medication SCH EA: at 06:02

## 2019-01-24 RX ADMIN — OXYCODONE HYDROCHLORIDE AND ACETAMINOPHEN PRN TAB: 5; 325 TABLET ORAL at 20:01

## 2019-01-24 RX ADMIN — OXYCODONE HYDROCHLORIDE AND ACETAMINOPHEN PRN TAB: 5; 325 TABLET ORAL at 14:33

## 2019-01-24 RX ADMIN — OMEGA-3 FATTY ACIDS CAP 1000 MG SCH MG: 1000 CAP at 08:48

## 2019-01-24 RX ADMIN — FAMOTIDINE SCH MG: 20 TABLET, FILM COATED ORAL at 08:57

## 2019-01-24 RX ADMIN — ENOXAPARIN SODIUM SCH MG: 30 INJECTION SUBCUTANEOUS at 08:47

## 2019-01-24 RX ADMIN — ASPIRIN SCH MG: 81 TABLET ORAL at 08:47

## 2019-01-24 RX ADMIN — ASPIRIN SCH MG: 81 TABLET ORAL at 08:51

## 2019-01-24 RX ADMIN — DOCUSATE SODIUM AND SENNOSIDES SCH EA: 8.6; 5 TABLET, FILM COATED ORAL at 20:01

## 2019-01-24 RX ADMIN — ALPRAZOLAM SCH MG: 0.25 TABLET ORAL at 08:48

## 2019-01-24 RX ADMIN — SODIUM CHLORIDE SCH MLS/HR: 900 INJECTION, SOLUTION INTRAVENOUS at 14:37

## 2019-01-24 NOTE — NUR
CM/SS met with the patient for discharge planning. Patient would like [facility] pt from [facility]. He reports he has a FWW and has a CPM machine. Will fax the [facility] information 
when have discharge instructions likely on 1/25.

## 2019-01-24 NOTE — OCC THERAPY PROGRESS NOTE
Therapy Progress Note


OT order received, chart reviewed.  Pt. dressed and in bed.  OT explained self 

to pt.  Pt. states that he had his previous knee done 6 months ago, and has all 

needed equipment at home.  Pt. states that he is doing well, and has not had 

any difficulty getting to the bathroom.  OT offers to assist him to shower.  

Pt. declines and states that he is fine.  States that he has no need for 

skilled training at this time.  All needs met in room.





1, visit


1050











RAJI JOHNS OT Jan 24, 2019 12:46

## 2019-01-24 NOTE — PHYSICAL THERAPY DAILY NOTE
PT Daily Note-Current


Subjective


Pt was in bed and wife was in room. Pt reports that he doesn't know if it was 

the CPM but his hip hurts and his knee pain is excruciating. Pt agrees to PT.





Pain





   Numeric Pain Scale:  10-Worst Possible Pain


   Location:  Right


   Location Body Site:  Knee





Mental Status


Patient Orientation:  Person, Place, Situation, Normal For Age


Attachments:  Polar Pack, IV





Transfers


Therapy Code Descriptions/Definitions 





Functional [place] Measure:


0=Not Assessed/NA        4=Minimal Assistance


1=Total Assistance        5=Supervision or Setup


2=Maximal Assistance  6=Modified [place]


3=Moderate Assistance 7=Complete [place]








Therapy Quality Codes:


6    Independent with activity with or without an assistive device


5    Patient requires set up or clean up by helper.  Patient completes activity

  by  themselves


4    Supervision or touching assist (CGA). [place] provide cues , steadying 

assist


3    The helper provides less than half the effort to complete the activity


2    The helper provides more than half the effort to complete the activity


1    Dependent.  The helper does all the effort to complete an activity 


7    Patient refused to complete or attempt activity


9    The patient did not perform the activity before the current illness or 

injury


88  Not attempted due to Medical conditions or safety concerns


Transfers (B, C, W/C) (FIM):  4


Scootin


Supine to/from Sit:  4


Sit to/from Stand:  5





Weight Bearing


Right Lower Extremity:  Right


Weight Bearing/Tolerated


Left Lower Extremity:  Left


Full Weight Bearing





Gait Training


Gait (FIM):  5


Distance (FIM):  3=150 ft


Distance:  200'


Gait Level of Assist:  5


Gait Persons Needed:  1


Gait Assistive Device:  FWW


Pt has antalgic gait and continues to walk on RLE very gingerly. PT instructs 

pt to put weight through and that it will help with the pain.





Exercises


Supine Ex:  Ankle pumps (PROM), Heel Slides (PROM)


Supine Reps:  10





Assessment


Current Status:  Fair Progress


Pt was able to get from supine to EOB with min A due to pain. Pt was able to sit

<>stand to FWW with SBA. Pt amb 200' with FWW and SBA. Pt returned to room and 

got back in bed. Pt was min A getting back in bed with wife helping him. SPT 

performed PROM/stretching for gastroc tightness and quad/hamstring tightness. 

Pt was not informed that he could take pain pills every 2 hrs and has not had 

any except through his IV. PT informed him that he can take pain pills as well 

every 2 hrs.  RN notified and issued pain medication. Pt is now in bed with all 

needs met.





PT Short Term Goals


Short Term Goals


Time Frame:  2019


Transfers (B,C,W/C) (FIM):  6


Gait (FIM):  6


Distance (FIM):  3=150 ft


Gait Distance Comment:  400'


Gait Level of Assist:  6


Gait Assistive Device:  FWW





PT Plan


Problem List


Problem List:  Activity Tolerance, Functional Strength, Safety, Balance, Gait, 

Transfer, Bed Mobility, ROM





Treatment/Plan


Treatment Plan:  Continue Plan of Care


Treatment Plan:  Bed Mobility, Education, Functional Activity Nathalia, Functional 

Strength, Gait, Safety, Therapeutic Exercise, Transfers


Treatment Duration:  2019


Frequency:  11 times per week


Estimated Hrs Per Day:  .25 hour per day


Patient and/or Family Agrees t:  Yes





Time/GCodes


Time In:  1420


Time Out:  1438


Total Billed Treatment Time:  18


Total Billed Treatment


1 visit





FA 18 min











GUERLINE MORIN PT 2019 14:50

## 2019-01-24 NOTE — PROGRESS NOTE-STANDARD
Standard Progress Note


Progress Notes/Assess & Plan


Date Seen by a Provider:  Jan 24, 2019


Time Seen by a Provider:  07:59


Progress/Assessment & Plan


post op check


no complaints


radiographs hardware well aligned. no fractures


RLE--intact DF and PF of toes and ankle. 2 plus DP pulse with brisk cap refill. 

sensation intact throughout


s/p RTKA 


mobilize as able


Final Diagnosis


no complaints





Vital Signs








  Date Time  Temp Pulse Resp B/P (MAP) Pulse Ox O2 Delivery O2 Flow Rate FiO2


 


1/24/19 07:45 97.2 72 20 162/72 (102) 97 Room Air  


 


1/24/19 07:00   18     


 


1/24/19 06:08 98.0 88 20 136/68 (90) 96 Room Air  


 


1/24/19 00:43 98.8 73 18 130/64 (86) 96 Room Air  


 


1/23/19 21:00      Room Air  


 


1/23/19 20:00 97.2 84 19 154/72 (99) 95 Room Air  


 


1/23/19 19:00   18     


 


1/23/19 18:00 97.2 84 19 154/72 (99) 95 Room Air  


 


1/23/19 16:00 99.1 76 20 150/79 (102) 97 Room Air  


 


1/23/19 14:33      Room Air  


 


1/23/19 12:00 97.1 74 18 144/76 (98) 96 Room Air  


 


1/23/19 10:40      Room Air  














I & O 


 


 1/24/19





 07:00


 


Intake Total 3950 ml


 


Balance 3950 ml








Laboratory Tests








Test


 1/24/19


05:40 Range/Units


 


 


Hemoglobin 11.9 L 13.3-17.7  G/DL


 


Hematocrit 37 L 40-54  %





RLE--NVI distally. No calf tenderrness. Neg [place]'s


s/p RTKA doing well


 continue mobilizing











MOLINA MEDINA MD Jan 24, 2019 08:00

## 2019-01-24 NOTE — PHYSICAL THERAPY DAILY NOTE
PT Daily Note-Current


Subjective


Pt was in bed and agreed to PT. Reports that he was not able to sleep at all 

but he says that it wasn't due to pain.





Pain





   Numeric Pain Scale:  3


   Location:  Right


   Location Body Site:  Knee





Mental Status


Patient Orientation:  Person, Normal For Age


Attachments:  IV





Transfers


Therapy Code Descriptions/Definitions 





Functional [place] Measure:


0=Not Assessed/NA        4=Minimal Assistance


1=Total Assistance        5=Supervision or Setup


2=Maximal Assistance  6=Modified [place]


3=Moderate Assistance 7=Complete [place]








Therapy Quality Codes:


6    Independent with activity with or without an assistive device


5    Patient requires set up or clean up by helper.  Patient completes activity

  by  themselves


4    Supervision or touching assist (CGA). [place] provide cues , steadying 

assist


3    The helper provides less than half the effort to complete the activity


2    The helper provides more than half the effort to complete the activity


1    Dependent.  The helper does all the effort to complete an activity 


7    Patient refused to complete or attempt activity


9    The patient did not perform the activity before the current illness or 

injury


88  Not attempted due to Medical conditions or safety concerns


Transfers (B, C, W/C) (FIM):  6


Scootin


Supine to/from Sit:  6


Sit to/from Stand:  6





Weight Bearing


Right Lower Extremity:  Right


Weight Bearing/Tolerated


Left Lower Extremity:  Left


Full Weight Bearing





Gait Training


Gait (FIM):  5


Distance (FIM):  3=150 ft


Distance:  150


Gait Level of Assist:  5


Gait Persons Needed:  1


Gait Assistive Device:  FWW


Pt amb was antalgic and was not putting 50% of weight through RLE. PT 

instructed pt to step with a heel first pattern instead of on his toes.





Exercises


Supine Ex:  Ankle pumps, Quad Set, Heel Slides, Short Arc Quads, Straight leg 

raise


Supine Reps:  15





Assessment


Current Status:  Fair Progress


Pt was able to perform supine ex in bed with min A due to pain and tightness. 

Pt is modified indep with bed mobility. Pt transfers to FWW from EOB modified 

indep. Pt amb 150' with FWW and SBA due to IV. Pt returned to bed and is on CPM 

at 80 flex and -2 ext. Pt has all needs met.





PT Short Term Goals


Short Term Goals


Time Frame:  2019


Transfers (B,C,W/C) (FIM):  6


Gait (FIM):  6


Distance (FIM):  3=150 ft


Gait Distance Comment:  400'


Gait Level of Assist:  6


Gait Assistive Device:  FWW





PT Plan


Problem List


Problem List:  Activity Tolerance, Functional Strength, Safety, Balance, Gait





Treatment/Plan


Treatment Plan:  Continue Plan of Care


Treatment Plan:  Bed Mobility, Education, Functional Activity Nathalia, Functional 

Strength, Gait, Safety, Therapeutic Exercise, Transfers


Treatment Duration:  2019


Frequency:  11 times per week


Estimated Hrs Per Day:  .25 hour per day


Patient and/or Family Agrees t:  Yes





Time/GCodes


Time In:  810


Time Out:  833


Total Billed Treatment Time:  23


Total Billed Treatment


1 visit





Gt 10 min


Ex 13 min











GUERLINE MORIN PT 2019 09:38

## 2019-01-24 NOTE — ANESTHESIA-GENERAL POST-OP
General


Patient Condition


Mental Status/LOC:  Same as Preop


Cardiovascular:  Satisfactory


Nausea/Vomiting:  Absent


Respiratory:  Satisfactory


Pain:  Controlled


Complications:  Absent





Post Op Complications


Complications


None





Follow Up Care/Instructions


Patient Instructions


None needed.





Anesthesia/Patient Condition


Patient Condition


Patient is doing well, no complaints, stable vital signs, no apparent adverse 

anesthesia problems.   


No complications reported per nursing.











MERISSA OSPINA CRNA Jan 24, 2019 07:48

## 2019-01-25 VITALS — SYSTOLIC BLOOD PRESSURE: 144 MMHG | DIASTOLIC BLOOD PRESSURE: 89 MMHG

## 2019-01-25 LAB
HCT VFR BLD CALC: 38 % (ref 40–54)
HGB BLD-MCNC: 11.9 G/DL (ref 13.3–17.7)

## 2019-01-25 RX ADMIN — OXYCODONE HYDROCHLORIDE AND ACETAMINOPHEN PRN TAB: 5; 325 TABLET ORAL at 03:40

## 2019-01-25 RX ADMIN — SODIUM CHLORIDE SCH MLS/HR: 900 INJECTION, SOLUTION INTRAVENOUS at 03:39

## 2019-01-25 RX ADMIN — Medication SCH EA: at 05:53

## 2019-01-25 RX ADMIN — OMEGA-3 FATTY ACIDS CAP 1000 MG SCH MG: 1000 CAP at 05:53

## 2019-01-25 RX ADMIN — ENOXAPARIN SODIUM SCH MG: 30 INJECTION SUBCUTANEOUS at 05:53

## 2019-01-25 NOTE — NUR
Pt elected to take his morning meds at home so there was no confusion on what meds he needed 
to take.

## 2019-01-25 NOTE — NUR
CM/SS had the patient sign choice form that [facility] was his preference for [facility] 
PT. Choice form is in patient chart. Spoke with [facility], provided necessary 
information by phone and faxed facesheet, discharge info, and C orders.

## 2019-01-25 NOTE — PROGRESS NOTE-STANDARD
Standard Progress Note


Progress Notes/Assess & Plan


Date Seen by a Provider:  Jan 25, 2019


Time Seen by a Provider:  06:58


Progress/Assessment & Plan


post op check


no complaints


radiographs hardware well aligned. no fractures


RLE--intact DF and PF of toes and ankle. 2 plus DP pulse with brisk cap refill. 

sensation intact throughout


s/p RTKA 


mobilize as able


Final Diagnosis


no complaints





Vital Signs








  Date Time  Temp Pulse Resp B/P (MAP) Pulse Ox O2 Delivery O2 Flow Rate FiO2


 


1/25/19 05:54   18     


 


1/25/19 04:47 98.0 83 18 144/89 (107) 98 Room Air  


 


1/25/19 02:54 98.0 83 18 144/89 (107) 98 Room Air  


 


1/24/19 23:32 98.1 74 18 137/79 (98) 96 Room Air  


 


1/24/19 20:00      Room Air  


 


1/24/19 19:18 99.3 65 18 139/67 (91) 93 Room Air  


 


1/24/19 18:38   18     


 


1/24/19 16:45 99.5 67 20 148/76 (100) 96 Room Air  


 


1/24/19 12:00 98.0 78 18 138/62 (87) 96 Room Air  


 


1/24/19 09:14      Room Air  


 


1/24/19 07:45 97.2 72 20 162/72 (102) 97 Room Air  


 


1/24/19 07:00   18     














I & O 


 


 1/25/19





 07:00


 


Intake Total 3680 ml


 


Balance 3680 ml








Laboratory Tests








Test


 1/25/19


05:30 Range/Units


 


 


Hemoglobin 11.9 L 13.3-17.7  G/DL


 


Hematocrit 38 L 40-54  %





RLE--incision clean and dry. No calf tenderness. Neg [place]'s. 


s/p RTKA doing well


 DC home











MOLINA MEDINA MD Jan 25, 2019 06:59

## 2019-01-25 NOTE — PHYSICAL THERAPY PROGRESS NOTE
Therapy Progress Note


PT attempt to treat patient at 800, however, he adamantly declined stating he 

is able to perform exercises and ambulate on his own and is waiting for his 

spouse to go home.  Patient reports his patient is better on this date.  

Patient dismissed to home with spouse and home health intervention.  


1 ref/DC











GUERLINE MORIN PT Jan 25, 2019 09:09

## 2019-01-26 NOTE — DISCHARGE SUMMARY
DATE OF SERVICE:  



DIAGNOSES:

1.  Right knee primary osteoarthritis.

2.  Hypertension.



PROCEDURE:

Right total knee arthroplasty.



SUMMARY:

The patient is a 69-year-old gentleman who was admitted the day of a right total

knee arthroplasty, which he underwent without complications.  Postoperatively,

he did very well.  At the time of discharge, he had attained independent status

with physical therapy.  His wound was clean and dry.  He had no calf tenderness.

 Negative [place] sign.  He was tolerating his diet well and tolerating pain with

oral pain medication.



CONDITION ON DISCHARGE:

Good.



DISCHARGE DIET:

Regular.



FOLLOWUP:

Follow up is in three weeks.



DISCHARGE MEDICATIONS:

Home medications, aspirin and Percocet as needed for pain.



ACTIVITY:

Weightbearing as tolerated right lower extremity with assistive devices as

needed.



DIET:

Regular.





Job ID: [id number]

DocumentID: [id number]

Dictated Date:  01/25/2019 06:56:33

Transcription Date: 01/26/2019 02:53:51

Dictated By: MOLINA MEDINA MD

## 2020-11-04 ENCOUNTER — HOSPITAL ENCOUNTER (OUTPATIENT)
Dept: HOSPITAL 75 - CARD | Age: 70
End: 2020-11-04
Attending: INTERNAL MEDICINE
Payer: MEDICARE

## 2020-11-04 DIAGNOSIS — I51.7: Primary | ICD-10-CM

## 2020-11-04 DIAGNOSIS — I10: ICD-10-CM

## 2020-11-04 DIAGNOSIS — Z82.49: ICD-10-CM

## 2020-11-04 DIAGNOSIS — E78.2: ICD-10-CM

## 2020-11-04 PROCEDURE — 93306 TTE W/DOPPLER COMPLETE: CPT

## 2024-09-03 NOTE — PHYSICAL THERAPY DAILY NOTE
PT Daily Note-Current


Subjective


Patient agrees to PT.  8/10 left knee pain.





Pain





   Numeric Pain Scale:  8


   Location:  Left


   Location Body Site:  Knee


   Pain Description:  Acute





Mental Status


Patient Orientation:  Normal For Age


Attachments:  IV





Transfers


              Functional [place] Measure


0=Not Assessed/NA   4=Minimal Assistance


1=Total Assistance   5=Supervision or Setup


2=Maximal Assistance   6=Modified [place]


3=Moderate Assistance   7=Complete IndependenceIRFPAI Quality Coding Scale











6 Independent with activity with or without an assistive device


 


5  Patient requires set up or clean up by helper.  Patient completes activity  

by  themselves


 


4 Supervision or touching assist (CGA). [place] provide cues , steadying assist


 


3 The helper provides less than half the effort to complete the activity


 


2 The helper provides more than half the effort to complete the activity


 


1 Dependent.  The helper does all the effort to complete an activity 


 


7 Patient refused to complete or attempt activity


 


9 The patient did not perform the activity before the current illness or injury


 


88 Not attempted due to Medical conditions or safety concerns








Transfers (B, C, W/C) (FIM):  6


Scootin


Rollin


Supine to/from Sit:  6


Sit to/from Stand:  6





Weight Bearing


Right Lower Extremity:  Right


Full Weight Bearing


Left Lower Extremity:  Left


Weight Bearing/Tolerated





Gait Training


Gait (FIM):  6


Distance (FIM):  3=150 ft


Distance:  300'


Gait Level of Assist:  6


Gait Assistive Device:  FWW


slow, antalgic, functional





Exercises


Supine Ex:  Ankle pumps, Quad Set, Heel Slides, Straight leg raise


Supine Reps:  15


Seated Therapy Exercises:  Long arc quads





Assessment


Patient tolerated treatment well and is up in recliner with needs met.  Patient 

is progressing as plan.





PT Long Term Goals


Long Term Goals


PT Long Term Goals Time Frame:  2018


Transfers (B,C,W/C) (FIM):  6


Gait (FIM):  6


Gait distance (FIM):  3=150 ft


Gait Assistive Device:  FWW


Stairs (FIM):  5





PT Plan


Treatment/Plan


Treatment Plan:  Continue Plan of Care


Treatment Plan:  Bed Mobility, Education, Functional Activity Nathalia, Functional 

Strength, Gait, Safety, Therapeutic Exercise, Transfers


Treatment Duration:  2018


Frequency:  11 times per week


Estimated Hrs Per Day:  1 hour per day


Patient and/or Family Agrees t:  Yes





Time/GCodes


Time In:  1225


Time Out:  1250


Total Billed Treatment Time:  25


Total Billed Treatment


1 visit


EX 15 min


GT 10 min











GUERLINE MORIN PT 2018 12:55
PT Daily Note-Current


Subjective


Patient agrees to PT.  No c/o.





Pain





   Numeric Pain Scale:  4


   Location:  Left


   Location Body Site:  Knee


   Pain Description:  Acute





Mental Status


Patient Orientation:  Normal For Age


Attachments:  IV





Transfers


              Functional [place] Measure


0=Not Assessed/NA   4=Minimal Assistance


1=Total Assistance   5=Supervision or Setup


2=Maximal Assistance   6=Modified [place]


3=Moderate Assistance   7=Complete IndependenceIRFPAI Quality Coding Scale











6 Independent with activity with or without an assistive device


 


5  Patient requires set up or clean up by helper.  Patient completes activity  

by  themselves


 


4 Supervision or touching assist (CGA). [place] provide cues , steadying assist


 


3 The helper provides less than half the effort to complete the activity


 


2 The helper provides more than half the effort to complete the activity


 


1 Dependent.  The helper does all the effort to complete an activity 


 


7 Patient refused to complete or attempt activity


 


9 The patient did not perform the activity before the current illness or injury


 


88 Not attempted due to Medical conditions or safety concerns








Transfers (B, C, W/C) (FIM):  6


Scootin


Rollin


Supine to/from Sit:  6


Sit to/from Stand:  6





Weight Bearing


Right Lower Extremity:  Right


Full Weight Bearing


Left Lower Extremity:  Left


Weight Bearing/Tolerated





Gait Training


Gait (FIM):  6


Distance (FIM):  3=150 ft


Distance:  300'


Gait Level of Assist:  6


Gait Assistive Device:  FWW


slow, antalgic





Exercises


Supine Ex:  Ankle pumps, Quad Set, Heel Slides


Supine Reps:  10


Seated Therapy Exercises:  Long arc quads


Seated Reps:  25





Treatments


CPM 0-75 degrees





Assessment


Patient progressing with treatment plan.  PT to increase activity as tolerated 

by patient.





PT Long Term Goals


Long Term Goals


PT Long Term Goals Time Frame:  2018


Transfers (B,C,W/C) (FIM):  6


Gait (FIM):  6


Gait distance (FIM):  3=150 ft


Gait Assistive Device:  FWW


Stairs (FIM):  5





PT Plan


Treatment/Plan


Treatment Plan:  Continue Plan of Care


Treatment Plan:  Bed Mobility, Education, Functional Activity Nathalia, Functional 

Strength, Gait, Safety, Therapeutic Exercise, Transfers


Treatment Duration:  2018


Frequency:  11 times per week


Estimated Hrs Per Day:  1 hour per day


Patient and/or Family Agrees t:  Yes





Time/GCodes


Time In:  830


Time Out:  855


Total Billed Treatment Time:  25


Total Billed Treatment


1 visit


EX 8 min


GT 17 min











GUERLINE MORIN PT 2018 09:45
Abdomen soft, non-tender and non-distended, no rebound, no guarding and no masses. no hepatosplenomegaly.